# Patient Record
Sex: MALE | Race: BLACK OR AFRICAN AMERICAN | NOT HISPANIC OR LATINO | URBAN - METROPOLITAN AREA
[De-identification: names, ages, dates, MRNs, and addresses within clinical notes are randomized per-mention and may not be internally consistent; named-entity substitution may affect disease eponyms.]

---

## 2024-06-01 ENCOUNTER — HOSPITAL ENCOUNTER (EMERGENCY)
Facility: HOSPITAL | Age: 34
Discharge: HOME | End: 2024-06-02
Attending: EMERGENCY MEDICINE
Payer: COMMERCIAL

## 2024-06-01 ENCOUNTER — APPOINTMENT (OUTPATIENT)
Dept: RADIOLOGY | Facility: HOSPITAL | Age: 34
End: 2024-06-01
Payer: COMMERCIAL

## 2024-06-01 VITALS
RESPIRATION RATE: 18 BRPM | HEIGHT: 70 IN | HEART RATE: 61 BPM | TEMPERATURE: 98.4 F | OXYGEN SATURATION: 97 % | SYSTOLIC BLOOD PRESSURE: 127 MMHG | DIASTOLIC BLOOD PRESSURE: 79 MMHG | WEIGHT: 300 LBS | BODY MASS INDEX: 42.95 KG/M2

## 2024-06-01 DIAGNOSIS — R07.89 ATYPICAL CHEST PAIN: ICD-10-CM

## 2024-06-01 DIAGNOSIS — R07.89 CHEST WALL PAIN: Primary | ICD-10-CM

## 2024-06-01 LAB
ALBUMIN SERPL BCP-MCNC: 4.8 G/DL (ref 3.4–5)
ALP SERPL-CCNC: 61 U/L (ref 33–120)
ALT SERPL W P-5'-P-CCNC: 29 U/L (ref 10–52)
ANION GAP SERPL CALC-SCNC: 16 MMOL/L (ref 10–20)
AST SERPL W P-5'-P-CCNC: 27 U/L (ref 9–39)
BILIRUB SERPL-MCNC: 0.5 MG/DL (ref 0–1.2)
BUN SERPL-MCNC: 11 MG/DL (ref 6–23)
CALCIUM SERPL-MCNC: 9.4 MG/DL (ref 8.6–10.3)
CARDIAC TROPONIN I PNL SERPL HS: <3 NG/L (ref 0–20)
CHLORIDE SERPL-SCNC: 102 MMOL/L (ref 98–107)
CO2 SERPL-SCNC: 23 MMOL/L (ref 21–32)
CREAT SERPL-MCNC: 1.04 MG/DL (ref 0.5–1.3)
EGFRCR SERPLBLD CKD-EPI 2021: >90 ML/MIN/1.73M*2
ERYTHROCYTE [DISTWIDTH] IN BLOOD BY AUTOMATED COUNT: 13.3 % (ref 11.5–14.5)
GLUCOSE SERPL-MCNC: 87 MG/DL (ref 74–99)
HCT VFR BLD AUTO: 45.1 % (ref 41–52)
HGB BLD-MCNC: 15.3 G/DL (ref 13.5–17.5)
MCH RBC QN AUTO: 29.5 PG (ref 26–34)
MCHC RBC AUTO-ENTMCNC: 33.9 G/DL (ref 32–36)
MCV RBC AUTO: 87 FL (ref 80–100)
NRBC BLD-RTO: 0 /100 WBCS (ref 0–0)
PLATELET # BLD AUTO: 213 X10*3/UL (ref 150–450)
POTASSIUM SERPL-SCNC: 3.6 MMOL/L (ref 3.5–5.3)
PROT SERPL-MCNC: 7.6 G/DL (ref 6.4–8.2)
RBC # BLD AUTO: 5.19 X10*6/UL (ref 4.5–5.9)
SODIUM SERPL-SCNC: 137 MMOL/L (ref 136–145)
WBC # BLD AUTO: 9.3 X10*3/UL (ref 4.4–11.3)

## 2024-06-01 PROCEDURE — 80053 COMPREHEN METABOLIC PANEL: CPT | Performed by: EMERGENCY MEDICINE

## 2024-06-01 PROCEDURE — 71046 X-RAY EXAM CHEST 2 VIEWS: CPT | Performed by: RADIOLOGY

## 2024-06-01 PROCEDURE — 84484 ASSAY OF TROPONIN QUANT: CPT | Performed by: EMERGENCY MEDICINE

## 2024-06-01 PROCEDURE — 99283 EMERGENCY DEPT VISIT LOW MDM: CPT

## 2024-06-01 PROCEDURE — 85027 COMPLETE CBC AUTOMATED: CPT | Performed by: EMERGENCY MEDICINE

## 2024-06-01 PROCEDURE — 36415 COLL VENOUS BLD VENIPUNCTURE: CPT | Performed by: EMERGENCY MEDICINE

## 2024-06-01 PROCEDURE — 71046 X-RAY EXAM CHEST 2 VIEWS: CPT

## 2024-06-01 ASSESSMENT — COLUMBIA-SUICIDE SEVERITY RATING SCALE - C-SSRS
1. IN THE PAST MONTH, HAVE YOU WISHED YOU WERE DEAD OR WISHED YOU COULD GO TO SLEEP AND NOT WAKE UP?: NO
6. HAVE YOU EVER DONE ANYTHING, STARTED TO DO ANYTHING, OR PREPARED TO DO ANYTHING TO END YOUR LIFE?: NO
2. HAVE YOU ACTUALLY HAD ANY THOUGHTS OF KILLING YOURSELF?: NO

## 2024-06-01 ASSESSMENT — PAIN DESCRIPTION - LOCATION: LOCATION: CHEST

## 2024-06-01 ASSESSMENT — PAIN - FUNCTIONAL ASSESSMENT: PAIN_FUNCTIONAL_ASSESSMENT: 0-10

## 2024-06-01 ASSESSMENT — PAIN DESCRIPTION - DESCRIPTORS: DESCRIPTORS: ACHING

## 2024-06-01 ASSESSMENT — PAIN SCALES - GENERAL: PAINLEVEL_OUTOF10: 6

## 2024-06-02 NOTE — ED NOTES
"Pt to ED from a graduation party [facility] with the complaint of left arm numbness, chest pain and \"not feeling right\". Wife at bedside to assist up history of complaint. Both were concerned of symptoms and wanted to get checked out for possible stroke or heart attack. Pt states he was diagnosed with a lump in the chest within the past week and has a follow up appointment next week.   Wife states that patient has had increased anxiety and worry since his diagnosis and doesn't know if this is anxiety related. PT was mildly hypertensive upon triage but VSS are now stable and patient is no longer complaining of chest pain/tightness.      Zayda Rojas, RN  06/01/24 9128    "

## 2024-06-02 NOTE — ED PROVIDER NOTES
HPI   Chief Complaint   Patient presents with    Chest Pain     PER PATIENT BEEN EXPERIENCING CHEST TIGHTNESS , AND NUMB TO HIS RIGHT ARM WITH SOB AND FEELS LUMP IN HIS CHEST BEEN GOING ON FOR ABOUT A WEEK NOW        HPI: []  34-year-old  male who has no medical history comes in with a prominent sternum.  He states few days ago he noticed that his sternum was prominent.  He felt it was a mass.  No pain.  No trauma no falls.  The last couple days he developed some nonspecific chest tightness.  Intermittent left arm tightness.  No pain pressure heaviness no trouble breathing no syncope or near syncope no chest pain pressure heaviness no PND no orthopnea no leg swelling.  He denies any hematemesis melena hematochezia denies hemoptysis.  Occasional smoker.    Past history: None  Social: Patient denies alcohol denies drugs he does smoke occasionally.  REVIEW OF SYSTEMS:    GENERAL.: No weight loss, fatigue, anorexia, insomnia, fever.    EYES: No vision loss, double vision, drainage, eye pain.    ENT: No pharyngitis, dry mouth.    CARDIOPULMONARY: No chest pain, palpitations, syncope, near syncope. No shortness of breath, cough, hemoptysis.  Positive chest tightness    GI: No abdominal pain, change in bowel habits, melena, hematemesis, hematochezia, nausea, vomiting, diarrhea.    : No discharge, dysuria, frequency, urgency, hematuria.    MS: No limb pain, joint pain, joint swelling.  Positive for a prominent breastbone.    SKIN: No rashes.    PSYCH: No depression, anxiety, suicidality, homicidality.    Review of systems is otherwise negative unless stated above or in history of present illness.  Social history, family history, allergies reviewed.  PHYSICAL EXAM:    GENERAL: Vitals noted, no distress. Alert and oriented  x 3. Non-toxic.  High BMI    EENT: TMs clear. Posterior oropharynx unremarkable. No meningismus. No LAD.     NECK: Supple. Nontender. No midline tenderness.     CARDIAC: Regular, rate, rhythm. No  murmurs rubs or gallops. No JVD    PULMONARY: Lungs clear bilaterally with good aeration. No wheezes rales or rhonchi. No respiratory distress.     ABDOMEN: Soft, nonsurgical. Nontender. No peritoneal signs. Normoactive bowel sounds. No pulsatile masses.     EXTREMITIES: No peripheral edema. Negative Homans bilaterally, no cords.  2+ bounding pulses well-perfused  Musculoskeletal: Patient has a very prominent manubrium sternum with no obvious masses nontender to palpation.  No crepitus.  SKIN: No rash. Intact.     NEURO: No focal neurologic deficits, NIH score of 0. Cranial nerves normal as tested from II through XII.     MEDICAL DECISION MAKING:  EKG on my interpretation shows a normal sinus rhythm left axis deviation rate in the mid 60s with no acute ischemic change.  CBC with chemistries LFTs are normal troponin negative chest x-ray negative.    Treatment ED: IV established placed on a cardiac monitor.    ED course: Patient remains asymptomatic.  Not requiring treatment.  Patient reassured that the lump he is feeling on his chest is his sternum    Impression: #1 prominent sternum, #2 atypical chest tightness    Plans and MDM: 34-year-old  male with no medical history comes in for evaluation of his sternum.  He felt few days ago that his sternum was prominent he has no pain no trauma no falls on exam he has a palpable manubrium sternum which appears to be absolutely normal with no erythema redness or tenderness to palpation no crepitus.  His heart score is low.  My suspicion for acute coronary syndrome dissection pulm embolism mass in the sternum sarcoma is low.  Patient be discharged home advised outpatient follow with primary doctor perhaps outpatient CT scan if indicated with strict return precaution.                          [place] Coma Scale Score: 15                     Patient History   No past medical history on file.  No past surgical history on file.  No family history on file.  Social History      Tobacco Use    Smoking status: Not on file    Smokeless tobacco: Not on file   Substance Use Topics    Alcohol use: Not on file    Drug use: Not on file       Physical Exam   ED Triage Vitals [06/01/24 2003]   Temperature Heart Rate Respirations BP   36.9 °C (98.4 °F) 82 20 (!) 150/94      Pulse Ox Temp Source Heart Rate Source Patient Position   99 % Tympanic Monitor --      BP Location FiO2 (%)     -- --       Physical Exam    ED Course & [facility]   ED Course as of 06/02/24 0109   Sun Jun 02, 2024   0037 Patient EKG on my interpretation shows normal sinus rhythm left axis deviation rate mid 70s with nonspecific ST-T wave changes.  His CBC with differential chemistries LFTs are normal troponin negative chest x-ray negative.  On exam patient is comfortable heart score is low or low suspicion for ACS dissection pulm embolism, will be discharged home with supportive care with close outpatient follow-up. [MT]      ED Course User Index  [MT] Juan Kirk MD         Diagnoses as of 06/02/24 0109   Chest wall pain   Atypical chest pain       Medical Decision Making      Procedure  Procedures     Juan Kirk MD  06/02/24 0112